# Patient Record
Sex: MALE | HISPANIC OR LATINO | Employment: FULL TIME | URBAN - METROPOLITAN AREA
[De-identification: names, ages, dates, MRNs, and addresses within clinical notes are randomized per-mention and may not be internally consistent; named-entity substitution may affect disease eponyms.]

---

## 2024-01-10 ENCOUNTER — APPOINTMENT (OUTPATIENT)
Dept: CT IMAGING | Facility: HOSPITAL | Age: 49
End: 2024-01-10
Payer: COMMERCIAL

## 2024-01-10 ENCOUNTER — HOSPITAL ENCOUNTER (EMERGENCY)
Facility: HOSPITAL | Age: 49
Discharge: HOME | End: 2024-01-10
Attending: EMERGENCY MEDICINE
Payer: COMMERCIAL

## 2024-01-10 VITALS
DIASTOLIC BLOOD PRESSURE: 68 MMHG | BODY MASS INDEX: 26.01 KG/M2 | HEART RATE: 68 BPM | WEIGHT: 156.09 LBS | HEIGHT: 65 IN | TEMPERATURE: 98.2 F | OXYGEN SATURATION: 98 % | SYSTOLIC BLOOD PRESSURE: 148 MMHG | RESPIRATION RATE: 18 BRPM

## 2024-01-10 DIAGNOSIS — S32.009A CLOSED FRACTURE OF TRANSVERSE PROCESS OF LUMBAR VERTEBRA, INITIAL ENCOUNTER: Primary | ICD-10-CM

## 2024-01-10 DIAGNOSIS — M54.89 MIDLINE BACK PAIN, UNSPECIFIED BACK LOCATION, UNSPECIFIED CHRONICITY: ICD-10-CM
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
## 2024-01-10 DIAGNOSIS — S22.009A CLOSED FRACTURE OF TRANSVERSE PROCESS OF THORACIC VERTEBRA, INITIAL ENCOUNTER: ICD-10-CM

## 2024-01-10 LAB
ALBUMIN SERPL-MCNC: 4.5 G/DL (ref 3.5–5.2)
ALBUMIN/GLOB SERPL: 1.4 G/DL
ALP SERPL-CCNC: 85 U/L (ref 39–117)
ALT SERPL W P-5'-P-CCNC: 27 U/L (ref 1–41)
ANION GAP SERPL CALCULATED.3IONS-SCNC: 9 MMOL/L (ref 5–15)
APTT PPP: 28.2 SECONDS (ref 61–76.5)
AST SERPL-CCNC: 20 U/L (ref 1–40)
BACTERIA UR QL AUTO: NORMAL /HPF
BASOPHILS # BLD AUTO: 0 10*3/MM3 (ref 0–0.2)
BASOPHILS NFR BLD AUTO: 0.3 % (ref 0–1.5)
BILIRUB SERPL-MCNC: 0.5 MG/DL (ref 0–1.2)
BILIRUB UR QL STRIP: NEGATIVE
BUN SERPL-MCNC: 18 MG/DL (ref 6–20)
BUN/CREAT SERPL: 19.6 (ref 7–25)
CALCIUM SPEC-SCNC: 9.1 MG/DL (ref 8.6–10.5)
CHLORIDE SERPL-SCNC: 105 MMOL/L (ref 98–107)
CLARITY UR: CLEAR
CO2 SERPL-SCNC: 26 MMOL/L (ref 22–29)
COLOR UR: ABNORMAL
CREAT SERPL-MCNC: 0.92 MG/DL (ref 0.76–1.27)
DEPRECATED RDW RBC AUTO: 44.6 FL (ref 37–54)
EGFRCR SERPLBLD CKD-EPI 2021: 102.6 ML/MIN/1.73
EOSINOPHIL # BLD AUTO: 0.2 10*3/MM3 (ref 0–0.4)
EOSINOPHIL NFR BLD AUTO: 2.3 % (ref 0.3–6.2)
ERYTHROCYTE [DISTWIDTH] IN BLOOD BY AUTOMATED COUNT: 13.7 % (ref 12.3–15.4)
GLOBULIN UR ELPH-MCNC: 3.2 GM/DL
GLUCOSE SERPL-MCNC: 93 MG/DL (ref 65–99)
GLUCOSE UR STRIP-MCNC: NEGATIVE MG/DL
HCT VFR BLD AUTO: 45.2 % (ref 37.5–51)
HGB BLD-MCNC: 15 G/DL (ref 13–17.7)
HGB UR QL STRIP.AUTO: NEGATIVE
HYALINE CASTS UR QL AUTO: NORMAL /LPF
INR PPP: 1 (ref 0.93–1.1)
KETONES UR QL STRIP: NEGATIVE
LEUKOCYTE ESTERASE UR QL STRIP.AUTO: ABNORMAL
LYMPHOCYTES # BLD AUTO: 1.8 10*3/MM3 (ref 0.7–3.1)
LYMPHOCYTES NFR BLD AUTO: 27.3 % (ref 19.6–45.3)
MCH RBC QN AUTO: 29.8 PG (ref 26.6–33)
MCHC RBC AUTO-ENTMCNC: 33.1 G/DL (ref 31.5–35.7)
MCV RBC AUTO: 89.9 FL (ref 79–97)
MONOCYTES # BLD AUTO: 0.4 10*3/MM3 (ref 0.1–0.9)
MONOCYTES NFR BLD AUTO: 6.3 % (ref 5–12)
NEUTROPHILS NFR BLD AUTO: 4.2 10*3/MM3 (ref 1.7–7)
NEUTROPHILS NFR BLD AUTO: 63.8 % (ref 42.7–76)
NITRITE UR QL STRIP: POSITIVE
NRBC BLD AUTO-RTO: 0.1 /100 WBC (ref 0–0.2)
PH UR STRIP.AUTO: 5.5 [PH] (ref 5–8)
PLATELET # BLD AUTO: 180 10*3/MM3 (ref 140–450)
PMV BLD AUTO: 11 FL (ref 6–12)
POTASSIUM SERPL-SCNC: 4.5 MMOL/L (ref 3.5–5.2)
PROT SERPL-MCNC: 7.7 G/DL (ref 6–8.5)
PROT UR QL STRIP: NEGATIVE
PROTHROMBIN TIME: 10.9 SECONDS (ref 9.6–11.7)
RBC # BLD AUTO: 5.03 10*6/MM3 (ref 4.14–5.8)
RBC # UR STRIP: NORMAL /HPF
REF LAB TEST METHOD: NORMAL
SODIUM SERPL-SCNC: 140 MMOL/L (ref 136–145)
SP GR UR STRIP: 1.02 (ref 1–1.03)
SQUAMOUS #/AREA URNS HPF: NORMAL /HPF
UROBILINOGEN UR QL STRIP: ABNORMAL
WBC # UR STRIP: NORMAL /HPF
WBC NRBC COR # BLD AUTO: 6.6 10*3/MM3 (ref 3.4–10.8)

## 2024-01-10 PROCEDURE — 25510000001 IOPAMIDOL PER 1 ML: Performed by: EMERGENCY MEDICINE

## 2024-01-10 PROCEDURE — 74177 CT ABD & PELVIS W/CONTRAST: CPT

## 2024-01-10 PROCEDURE — 85610 PROTHROMBIN TIME: CPT | Performed by: PHYSICIAN ASSISTANT

## 2024-01-10 PROCEDURE — 85025 COMPLETE CBC W/AUTO DIFF WBC: CPT | Performed by: PHYSICIAN ASSISTANT

## 2024-01-10 PROCEDURE — 72125 CT NECK SPINE W/O DYE: CPT

## 2024-01-10 PROCEDURE — 99285 EMERGENCY DEPT VISIT HI MDM: CPT

## 2024-01-10 PROCEDURE — 85730 THROMBOPLASTIN TIME PARTIAL: CPT | Performed by: PHYSICIAN ASSISTANT

## 2024-01-10 PROCEDURE — 71260 CT THORAX DX C+: CPT

## 2024-01-10 PROCEDURE — 80053 COMPREHEN METABOLIC PANEL: CPT | Performed by: PHYSICIAN ASSISTANT

## 2024-01-10 PROCEDURE — 70450 CT HEAD/BRAIN W/O DYE: CPT

## 2024-01-10 PROCEDURE — 81001 URINALYSIS AUTO W/SCOPE: CPT | Performed by: PHYSICIAN ASSISTANT

## 2024-01-10 RX ORDER — SODIUM CHLORIDE 0.9 % (FLUSH) 0.9 %
10 SYRINGE (ML) INJECTION AS NEEDED
Status: DISCONTINUED | OUTPATIENT
Start: 2024-01-10 | End: 2024-01-10 | Stop reason: HOSPADM

## 2024-01-10 RX ORDER — HYDROCODONE BITARTRATE AND ACETAMINOPHEN 7.5; 325 MG/1; MG/1
1 TABLET ORAL EVERY 6 HOURS PRN
Qty: 15 TABLET | Refills: 0 | Status: SHIPPED | OUTPATIENT
Start: 2024-01-10

## 2024-01-10 RX ADMIN — IOPAMIDOL 100 ML: 755 INJECTION, SOLUTION INTRAVENOUS at 12:55

## 2024-01-10 NOTE — ED PROVIDER NOTES
Subjective   History of Present Illness  Chief Complaint: Fall, abdominal pain    Patient is a 48-year-old  male with no past medical history presents the ER with complaints of back pain abdominal pain after falling from a tree 8 foot high about a week ago.  Patient states that he fell backward landing directly on a branch on his back.  Patient is unsure if he hit his head but suspects that he did lose consciousness.  Patient reports that he did travel to [place] to get checked out but states last night he started having blood in his urine.  He does have significant bruising to the left flank.  Denies any chest pain or shortness of breath.  No hemoptysis.  Denies headache lightheadedness or blurry vision.  No saddle anesthesia or bladder or bowel dysfunction.  No lower extremity swelling or paresthesias.    PCP: None    History provided by:  Patient      Review of Systems   Constitutional:  Negative for chills and fever.   HENT:  Negative for sore throat and trouble swallowing.    Respiratory:  Negative for cough, shortness of breath and wheezing.    Cardiovascular:  Negative for chest pain.   Gastrointestinal:  Positive for abdominal pain. Negative for diarrhea and nausea.   Genitourinary:  Positive for hematuria. Negative for dysuria.   Musculoskeletal:  Positive for back pain and myalgias. Negative for neck pain.   Skin:  Positive for color change. Negative for rash.        Bruising   Neurological:  Positive for syncope. Negative for weakness and headaches.   Psychiatric/Behavioral:  Negative for behavioral problems.    All other systems reviewed and are negative.      No past medical history on file.    No Known Allergies    No past surgical history on file.    No family history on file.    Social History     Socioeconomic History    Marital status:            Objective   Physical Exam  Vitals and nursing note reviewed.   Constitutional:       General: He is not in acute distress.     Appearance:  "Normal appearance. He is normal weight. He is not diaphoretic.   HENT:      Head: Normocephalic and atraumatic.      Nose: Nose normal. No congestion.      Mouth/Throat:      Mouth: Mucous membranes are moist.   Eyes:      Extraocular Movements: Extraocular movements intact.      Pupils: Pupils are equal, round, and reactive to light.   Cardiovascular:      Rate and Rhythm: Normal rate and regular rhythm.      Pulses: Normal pulses.      Heart sounds: Normal heart sounds. No murmur heard.  Pulmonary:      Effort: Pulmonary effort is normal.      Breath sounds: Normal breath sounds.   Abdominal:      General: Abdomen is flat.      Palpations: Abdomen is soft.      Tenderness: There is no abdominal tenderness. There is no right CVA tenderness or left CVA tenderness.   Musculoskeletal:         General: Tenderness present. No swelling or deformity. Normal range of motion.      Cervical back: Normal range of motion.      Comments: Lumbar spine: No step-offs or deformities, mild midline tenderness to palpation.  Bilateral lower extremities: Negative straight leg raise.  5 out of 5 strength.  Sensation intact to light touch.  2+ reflexes.  No clonus.  Negative Babinski sign.   Skin:     General: Skin is warm.      Capillary Refill: Capillary refill takes less than 2 seconds.      Findings: Bruising present.      Comments: Bruising left flank with surrounding tenderness but no rigidity   Neurological:      General: No focal deficit present.      Mental Status: He is alert and oriented to person, place, and time.      Motor: No weakness.   Psychiatric:         Mood and Affect: Mood normal.         Behavior: Behavior normal.         Procedures           ED Course    /68   Pulse 68   Temp 98.2 °F (36.8 °C) (Oral)   Resp 18   Ht 165.1 cm (65\")   Wt 70.8 kg (156 lb 1.4 oz)   SpO2 98%   BMI 25.97 kg/m²   Labs Reviewed   APTT - Abnormal; Notable for the following components:       Result Value    PTT 28.2 (*)     All " other components within normal limits   URINALYSIS W/ CULTURE IF INDICATED - Abnormal; Notable for the following components:    Color, UA Other (*)     Leuk Esterase, UA Trace (*)     Nitrite, UA Positive (*)     All other components within normal limits    Narrative:     In absence of clinical symptoms, the presence of pyuria, bacteria, and/or nitrites on the urinalysis result does not correlate with infection.   PROTIME-INR - Normal   CBC WITH AUTO DIFFERENTIAL - Normal   COMPREHENSIVE METABOLIC PANEL    Narrative:     GFR Normal >60  Chronic Kidney Disease <60  Kidney Failure <15     URINALYSIS, MICROSCOPIC ONLY   CBC AND DIFFERENTIAL    Narrative:     The following orders were created for panel order CBC & Differential.  Procedure                               Abnormality         Status                     ---------                               -----------         ------                     CBC Auto Differential[[id number]]        Normal              Final result                 Please view results for these tests on the individual orders.     Medications   sodium chloride 0.9 % flush 10 mL (has no administration in time range)   iopamidol (ISOVUE-370) 76 % injection 100 mL (100 mL Intravenous Given 1/10/24 1255)     CT Chest With Contrast Diagnostic    Result Date: 1/10/2024  Impression: Chest: 1. Tiny avulsion fracture at the left T12 transverse process. 2. No additional traumatic findings in the chest. Abdomen and pelvis: 1. Acute fractures of the left L1-L4 transverse processes. 2. No solid organ injury in the abdomen or pelvis. Electronically Signed: Larry Meyers MD  1/10/2024 1:29 PM EST  Workstation ID: [id number]    CT Abdomen Pelvis With Contrast    Result Date: 1/10/2024  Impression: Chest: 1. Tiny avulsion fracture at the left T12 transverse process. 2. No additional traumatic findings in the chest. Abdomen and pelvis: 1. Acute fractures of the left L1-L4 transverse processes. 2. No solid organ injury  in the abdomen or pelvis. Electronically Signed: Larry Meyers MD  1/10/2024 1:29 PM EST  Workstation ID: [id number]    CT Head Without Contrast    Result Date: 1/10/2024  Impression: No acute intracranial abnormality. Small laceration and subcutaneous edema along the left parietal scalp. No evidence of fracture. Electronically Signed: Alexander Seth MD  1/10/2024 1:18 PM EST  Workstation ID: [id number]    CT Cervical Spine Without Contrast    Result Date: 1/10/2024  Impression: No acute abnormality of the cervical spine. Electronically Signed: Alexander Seth MD  1/10/2024 1:09 PM EST  Workstation ID: [id number]                                            Medical Decision Making  Differential Dx (Includes but not limited to): Intra-abdominal hemorrhage, splenic laceration liver laceration kidney injury, kidney contusion, spine fracture hip fracture  Medical Records Reviewed: No pertinent records to review  Labs: On my interpretation CBC no leukocytosis.  Hemoglobin stable.  CMP unremarkable.  Urinalysis 0-2 RBCs.  No bacteria or WBCs  Imaging: On my interpretation CT head shows no acute intracranial hemorrhage.  CT cervical spine unremarkable.  CT chest unremarkable.  Significant findings for transverse fracture multiple vertebrae in the lumbar spine  Telemetry: N/A  Testing considered but not ordered: X-ray knee or ankle, no complaints of pain there  Nature of Complaint: Acute  Admission vs Discharge: Discharge  Discussion: While in the ED IV was placed and labs were obtained appropriate PPE was worn during exam and throughout all encounters with the patient.  Patient had the above evaluation.  He is afebrile nontoxic appearance in no acute distress.  Patient is ambulatory in the emergency department.  Lab work reassuring.  Urinalysis is discolored however there is 0-2 RBCs, no WBCs or bacteria.  No ANDREA.  CT imaging unremarkable.  No evidence for intra-abdominal hemorrhage or solid organ injury.  He does have multiple  transverse process fractures on multiple lumbar and thoracic vertebrae.  Patient was notified of these findings.  He will be discharged with prescription for Norco for pain.  Recommend follow-up with neuro spine for recheck.  He has no focal motor or sensory deficit on exam no saddle anesthesia or bladder or bowel dysfunction.    Discharge plan and instructions were discussed with the patient who verbalized understanding and is in agreement with the plan, all questions were answered at this time.  Patient is aware of signs symptoms that would require immediate return to the emergency room.  Patient understands importance of following up with primary care provider for further evaluation and worsening concerns as well as blood pressure recheck in the next 4 weeks.    Patient was discharged in improved stable condition with an upright steady gait.    Patient is aware that discharge does not mean that nothing is wrong but indicates no emergencies present and they must continue care with follow-up as given below or physician of their choice.    Problems Addressed:  Closed fracture of transverse process of lumbar vertebra, initial encounter: acute illness or injury  Closed fracture of transverse process of thoracic vertebra, initial encounter: acute illness or injury  Fall, initial encounter: acute illness or injury  Midline back pain, unspecified back location, unspecified chronicity: acute illness or injury    Amount and/or Complexity of Data Reviewed  Labs: ordered. Decision-making details documented in ED Course.  Radiology: ordered. Decision-making details documented in ED Course.    Risk  Prescription drug management.        Final diagnoses:   Closed fracture of transverse process of lumbar vertebra, initial encounter   Closed fracture of transverse process of thoracic vertebra, initial encounter   Midline back pain, unspecified back location, unspecified chronicity   Fall, initial encounter       ED Disposition  ED  Disposition       ED Disposition   Discharge    Condition   Stable    Comment   --               PATIENT CONNECTION - [facility]  [facility] [address]  [phone]  Schedule an appointment as soon as possible for a visit in 2 days  As needed, If symptoms worsen    Ale Madera APRN  [facility] [place] [address]  [phone]    Schedule an appointment as soon as possible for a visit in 2 days  As needed, If symptoms worsen         Medication List        New Prescriptions      HYDROcodone-acetaminophen 7.5-325 MG per tablet  Commonly known as: NORCO  Take 1 tablet by mouth Every 6 (Six) Hours As Needed for Mild Pain.               Where to Get Your Medications        These medications were sent to [facility] [id number] - [place], [place] - [address] [facility] - [phone]  - [phone] FX  [address] [facility] [place] [address]      Phone: [phone]   HYDROcodone-acetaminophen 7.5-325 MG per tablet            Judy Blackburn PA  01/10/24 0220

## 2024-01-10 NOTE — DISCHARGE INSTRUCTIONS
Take Norco as needed for pain    Follow-up with primary care for recheck  Follow-up with neuro spine for reevaluation as needed    Avoid further back injury or heavy lifting or bending.    Return to the ER for new or worsening symptoms